# Patient Record
Sex: FEMALE | Race: BLACK OR AFRICAN AMERICAN | NOT HISPANIC OR LATINO | URBAN - METROPOLITAN AREA
[De-identification: names, ages, dates, MRNs, and addresses within clinical notes are randomized per-mention and may not be internally consistent; named-entity substitution may affect disease eponyms.]

---

## 2017-01-21 ENCOUNTER — INPATIENT (INPATIENT)
Facility: HOSPITAL | Age: 47
LOS: 2 days | Discharge: HOME | End: 2017-01-24
Attending: INTERNAL MEDICINE | Admitting: INTERNAL MEDICINE
Payer: COMMERCIAL

## 2017-01-21 VITALS
RESPIRATION RATE: 17 BRPM | HEART RATE: 115 BPM | OXYGEN SATURATION: 97 % | DIASTOLIC BLOOD PRESSURE: 80 MMHG | SYSTOLIC BLOOD PRESSURE: 137 MMHG | TEMPERATURE: 99 F

## 2017-01-21 DIAGNOSIS — Z98.89 OTHER SPECIFIED POSTPROCEDURAL STATES: Chronic | ICD-10-CM

## 2017-01-21 LAB
APPEARANCE UR: SIGNIFICANT CHANGE UP
BASE EXCESS BLDV CALC-SCNC: 3.6 MMOL/L — SIGNIFICANT CHANGE UP
BASOPHILS # BLD AUTO: 0.01 K/UL — SIGNIFICANT CHANGE UP (ref 0–0.2)
BASOPHILS NFR BLD AUTO: 0.1 % — SIGNIFICANT CHANGE UP (ref 0–2)
BILIRUB UR-MCNC: NEGATIVE — SIGNIFICANT CHANGE UP
BLOOD GAS VENOUS - CREATININE: 1.01 MG/DL — SIGNIFICANT CHANGE UP (ref 0.5–1.3)
BLOOD UR QL VISUAL: HIGH
CHLORIDE BLDV-SCNC: 100 MMOL/L — SIGNIFICANT CHANGE UP (ref 96–108)
COLOR SPEC: YELLOW — SIGNIFICANT CHANGE UP
EOSINOPHIL # BLD AUTO: 0.12 K/UL — SIGNIFICANT CHANGE UP (ref 0–0.5)
EOSINOPHIL NFR BLD AUTO: 1.2 % — SIGNIFICANT CHANGE UP (ref 0–6)
GAS PNL BLDV: 131 MMOL/L — LOW (ref 136–146)
GLUCOSE BLDV-MCNC: 124 — HIGH (ref 70–99)
GLUCOSE UR-MCNC: NEGATIVE — SIGNIFICANT CHANGE UP
HCO3 BLDV-SCNC: 26 MMOL/L — SIGNIFICANT CHANGE UP (ref 20–27)
HCT VFR BLD CALC: 39.1 % — SIGNIFICANT CHANGE UP (ref 34.5–45)
HCT VFR BLDV CALC: 41.4 % — SIGNIFICANT CHANGE UP (ref 34.5–45)
HGB BLD-MCNC: 13.3 G/DL — SIGNIFICANT CHANGE UP (ref 11.5–15.5)
HGB BLDV-MCNC: 13.5 G/DL — SIGNIFICANT CHANGE UP (ref 11.5–15.5)
IMM GRANULOCYTES NFR BLD AUTO: 0.2 % — SIGNIFICANT CHANGE UP (ref 0–1.5)
KETONES UR-MCNC: NEGATIVE — SIGNIFICANT CHANGE UP
LACTATE BLDV-MCNC: 1.1 MMOL/L — SIGNIFICANT CHANGE UP (ref 0.5–2)
LEUKOCYTE ESTERASE UR-ACNC: HIGH
LYMPHOCYTES # BLD AUTO: 2.22 K/UL — SIGNIFICANT CHANGE UP (ref 1–3.3)
LYMPHOCYTES # BLD AUTO: 23.1 % — SIGNIFICANT CHANGE UP (ref 13–44)
MCHC RBC-ENTMCNC: 30.8 PG — SIGNIFICANT CHANGE UP (ref 27–34)
MCHC RBC-ENTMCNC: 34 % — SIGNIFICANT CHANGE UP (ref 32–36)
MCV RBC AUTO: 90.5 FL — SIGNIFICANT CHANGE UP (ref 80–100)
MONOCYTES # BLD AUTO: 0.88 K/UL — SIGNIFICANT CHANGE UP (ref 0–0.9)
MONOCYTES NFR BLD AUTO: 9.1 % — SIGNIFICANT CHANGE UP (ref 2–14)
NEUTROPHILS # BLD AUTO: 6.38 K/UL — SIGNIFICANT CHANGE UP (ref 1.8–7.4)
NEUTROPHILS NFR BLD AUTO: 66.3 % — SIGNIFICANT CHANGE UP (ref 43–77)
NITRITE UR-MCNC: NEGATIVE — SIGNIFICANT CHANGE UP
PCO2 BLDV: 46 MMHG — SIGNIFICANT CHANGE UP (ref 41–51)
PH BLDV: 7.41 PH — SIGNIFICANT CHANGE UP (ref 7.32–7.43)
PH UR: 6.5 — SIGNIFICANT CHANGE UP (ref 4.6–8)
PLATELET # BLD AUTO: 213 K/UL — SIGNIFICANT CHANGE UP (ref 150–400)
PMV BLD: 9.7 FL — SIGNIFICANT CHANGE UP (ref 7–13)
PO2 BLDV: 26 MMHG — LOW (ref 35–40)
POTASSIUM BLDV-SCNC: 3.7 MMOL/L — SIGNIFICANT CHANGE UP (ref 3.4–4.5)
PROT UR-MCNC: NEGATIVE — SIGNIFICANT CHANGE UP
RBC # BLD: 4.32 M/UL — SIGNIFICANT CHANGE UP (ref 3.8–5.2)
RBC # FLD: 12.4 % — SIGNIFICANT CHANGE UP (ref 10.3–14.5)
RBC CASTS # UR COMP ASSIST: HIGH (ref 0–?)
SAO2 % BLDV: 41 % — LOW (ref 60–85)
SP GR SPEC: 1.01 — SIGNIFICANT CHANGE UP (ref 1–1.03)
SQUAMOUS # UR AUTO: SIGNIFICANT CHANGE UP
UROBILINOGEN FLD QL: NORMAL E.U. — SIGNIFICANT CHANGE UP (ref 0.1–0.2)
WBC # BLD: 9.63 K/UL — SIGNIFICANT CHANGE UP (ref 3.8–10.5)
WBC # FLD AUTO: 9.63 K/UL — SIGNIFICANT CHANGE UP (ref 3.8–10.5)
WBC UR QL: HIGH (ref 0–?)

## 2017-01-21 RX ORDER — ACETAMINOPHEN 500 MG
650 TABLET ORAL ONCE
Qty: 0 | Refills: 0 | Status: COMPLETED | OUTPATIENT
Start: 2017-01-21 | End: 2017-01-21

## 2017-01-21 RX ORDER — SODIUM CHLORIDE 9 MG/ML
1000 INJECTION INTRAMUSCULAR; INTRAVENOUS; SUBCUTANEOUS ONCE
Qty: 0 | Refills: 0 | Status: COMPLETED | OUTPATIENT
Start: 2017-01-21 | End: 2017-01-21

## 2017-01-21 RX ORDER — MORPHINE SULFATE 50 MG/1
4 CAPSULE, EXTENDED RELEASE ORAL ONCE
Qty: 0 | Refills: 0 | Status: DISCONTINUED | OUTPATIENT
Start: 2017-01-21 | End: 2017-01-21

## 2017-01-21 RX ADMIN — MORPHINE SULFATE 4 MILLIGRAM(S): 50 CAPSULE, EXTENDED RELEASE ORAL at 23:29

## 2017-01-21 RX ADMIN — SODIUM CHLORIDE 1000 MILLILITER(S): 9 INJECTION INTRAMUSCULAR; INTRAVENOUS; SUBCUTANEOUS at 23:29

## 2017-01-21 RX ADMIN — Medication 650 MILLIGRAM(S): at 23:29

## 2017-01-21 NOTE — ED PROVIDER NOTE - MEDICAL DECISION MAKING DETAILS
47 yo F pt presenting with 2 days of fevers, malaise and 1 day of abdominal pain, RLQ tenderness. Consider appy vs. colitis. 45 yo F pt presenting with 2 days of fevers, malaise and 1 day of abdominal pain, RLQ tenderness. Consider appy vs. colitis. Also possibly ovarian pathology, though systemic sx less c/w this dx.  TVUS if sono WNL.

## 2017-01-21 NOTE — ED PROVIDER NOTE - NS ED MD SCRIBE ATTENDING SCRIBE SECTIONS
HISTORY OF PRESENT ILLNESS/PAST MEDICAL/SURGICAL/SOCIAL HISTORY/REVIEW OF SYSTEMS/HIV/PHYSICAL EXAM/DISPOSITION/VITAL SIGNS( Pullset)

## 2017-01-21 NOTE — ED PROVIDER NOTE - CARE PLAN
Instructions for follow-up, activity and diet:	Take medications as prescribed. Follow up with Urology (see list) about this issue (these issues):  kidney stone.  Return if fevers or if pain not controlled with oral medications. Principal Discharge DX:	Diverticulitis of large intestine with abscess without bleeding  Instructions for follow-up, activity and diet:	Take medications as prescribed. Follow up with Urology (see list) about this issue (these issues):  kidney stone.  Return if fevers or if pain not controlled with oral medications.

## 2017-01-21 NOTE — ED ADULT NURSE NOTE - OBJECTIVE STATEMENT
47 yo female received in intake.  Pt c/o right sided abdominal/flank pain with associated nausea and vomiting starting today.  Took Ibuprofen (1900) and Laxative.  LMP: 3 weeks ago.  18R ac.  Pt is resting comfortable in bed.  Vital signs as noted.

## 2017-01-21 NOTE — ED PROVIDER NOTE - PLAN OF CARE
Take medications as prescribed. Follow up with Urology (see list) about this issue (these issues):  kidney stone.  Return if fevers or if pain not controlled with oral medications.

## 2017-01-21 NOTE — ED PROVIDER NOTE - DETAILS:
This scribe's documentation has been prepared under my direction and personally reviewed by me in its entirety. I confirm that the note above accurately reflects all work, treatment, procedures, and medical decision making performed by me.

## 2017-01-21 NOTE — ED PROVIDER NOTE - OBJECTIVE STATEMENT
47 yo F pt w/ no significant PMHx presents to the ED c/o right sided abdominal/flank pain with associated nausea and vomiting starting today. Pt also endorses fevers and chills yesterday. Pt took Ibuprofen (last dose 4 hours ago - 7 PM) and Laxative - had no relief. Rates pain 8/10. Denies hx of similar sx. Denies sob, rash, vaginal discharge/bleeding. LMP: 3 weeks ago. Denies hx of Kidney stones, Ovarian cysts. 45 yo F pt w/ no significant PMHx presents to the ED c/o right sided abdominal/flank pain with associated nausea and vomiting starting today. Pt also endorses fevers and chills since yesterday. Pt took Ibuprofen (last dose 4 hours ago - 7 PM) and Laxative - had no relief. Rates pain 8/10. Denies hx of similar sx. Denies sob, rash, vaginal discharge/bleeding. LMP: 3 weeks ago. Denies hx of Kidney stones, Ovarian cysts.

## 2017-01-21 NOTE — ED PROVIDER NOTE - PROGRESS NOTE DETAILS
pt signed out pending CT scan which showed divertic. Pt had continued pain.  Discussed results and possibliity for inpatient vs outpatient care.  Pt opted to be admitted due to continued pain and concerns over treating at home.  Will admit to hospitalist as pt is affiliated with Dr Cynthia Vidal

## 2017-01-21 NOTE — ED ADULT TRIAGE NOTE - CHIEF COMPLAINT QUOTE
Pt. c/o right sided abdominal/flank pain since today accompanied by n/v this morning and  fever and chills (since last night). Also c/o constipation today. Denies dysuria or hematuria. LMP 3 weeks ago. Took ibuprofen at 8pm tonight.

## 2017-01-22 DIAGNOSIS — Z41.8 ENCOUNTER FOR OTHER PROCEDURES FOR PURPOSES OTHER THAN REMEDYING HEALTH STATE: ICD-10-CM

## 2017-01-22 DIAGNOSIS — K57.92 DIVERTICULITIS OF INTESTINE, PART UNSPECIFIED, WITHOUT PERFORATION OR ABSCESS WITHOUT BLEEDING: ICD-10-CM

## 2017-01-22 DIAGNOSIS — K59.00 CONSTIPATION, UNSPECIFIED: ICD-10-CM

## 2017-01-22 DIAGNOSIS — K57.93 DIVERTICULITIS OF INTESTINE, PART UNSPECIFIED, WITHOUT PERFORATION OR ABSCESS WITH BLEEDING: ICD-10-CM

## 2017-01-22 LAB
ALBUMIN SERPL ELPH-MCNC: 3.6 G/DL — SIGNIFICANT CHANGE UP (ref 3.3–5)
ALBUMIN SERPL ELPH-MCNC: 4.1 G/DL — SIGNIFICANT CHANGE UP (ref 3.3–5)
ALP SERPL-CCNC: 54 U/L — SIGNIFICANT CHANGE UP (ref 40–120)
ALP SERPL-CCNC: 58 U/L — SIGNIFICANT CHANGE UP (ref 40–120)
ALT FLD-CCNC: 10 U/L — SIGNIFICANT CHANGE UP (ref 4–33)
ALT FLD-CCNC: 11 U/L — SIGNIFICANT CHANGE UP (ref 4–33)
APTT BLD: 31.5 SEC — SIGNIFICANT CHANGE UP (ref 27.5–37.4)
AST SERPL-CCNC: 15 U/L — SIGNIFICANT CHANGE UP (ref 4–32)
AST SERPL-CCNC: 15 U/L — SIGNIFICANT CHANGE UP (ref 4–32)
BASOPHILS # BLD AUTO: 0.02 K/UL — SIGNIFICANT CHANGE UP (ref 0–0.2)
BASOPHILS NFR BLD AUTO: 0.2 % — SIGNIFICANT CHANGE UP (ref 0–2)
BILIRUB SERPL-MCNC: 0.4 MG/DL — SIGNIFICANT CHANGE UP (ref 0.2–1.2)
BILIRUB SERPL-MCNC: 0.5 MG/DL — SIGNIFICANT CHANGE UP (ref 0.2–1.2)
BLD GP AB SCN SERPL QL: NEGATIVE — SIGNIFICANT CHANGE UP
BUN SERPL-MCNC: 14 MG/DL — SIGNIFICANT CHANGE UP (ref 7–23)
BUN SERPL-MCNC: 17 MG/DL — SIGNIFICANT CHANGE UP (ref 7–23)
CALCIUM SERPL-MCNC: 8.7 MG/DL — SIGNIFICANT CHANGE UP (ref 8.4–10.5)
CALCIUM SERPL-MCNC: 9.5 MG/DL — SIGNIFICANT CHANGE UP (ref 8.4–10.5)
CHLORIDE SERPL-SCNC: 100 MMOL/L — SIGNIFICANT CHANGE UP (ref 98–107)
CHLORIDE SERPL-SCNC: 95 MMOL/L — LOW (ref 98–107)
CO2 SERPL-SCNC: 21 MMOL/L — LOW (ref 22–31)
CO2 SERPL-SCNC: 25 MMOL/L — SIGNIFICANT CHANGE UP (ref 22–31)
CREAT SERPL-MCNC: 0.84 MG/DL — SIGNIFICANT CHANGE UP (ref 0.5–1.3)
CREAT SERPL-MCNC: 0.95 MG/DL — SIGNIFICANT CHANGE UP (ref 0.5–1.3)
EOSINOPHIL # BLD AUTO: 0.13 K/UL — SIGNIFICANT CHANGE UP (ref 0–0.5)
EOSINOPHIL NFR BLD AUTO: 1.5 % — SIGNIFICANT CHANGE UP (ref 0–6)
GLUCOSE SERPL-MCNC: 111 MG/DL — HIGH (ref 70–99)
GLUCOSE SERPL-MCNC: 120 MG/DL — HIGH (ref 70–99)
HCT VFR BLD CALC: 37.6 % — SIGNIFICANT CHANGE UP (ref 34.5–45)
HGB BLD-MCNC: 12.7 G/DL — SIGNIFICANT CHANGE UP (ref 11.5–15.5)
IMM GRANULOCYTES NFR BLD AUTO: 0.1 % — SIGNIFICANT CHANGE UP (ref 0–1.5)
INR BLD: 1.1 — SIGNIFICANT CHANGE UP (ref 0.87–1.18)
LYMPHOCYTES # BLD AUTO: 2.33 K/UL — SIGNIFICANT CHANGE UP (ref 1–3.3)
LYMPHOCYTES # BLD AUTO: 26.7 % — SIGNIFICANT CHANGE UP (ref 13–44)
MAGNESIUM SERPL-MCNC: 1.9 MG/DL — SIGNIFICANT CHANGE UP (ref 1.6–2.6)
MCHC RBC-ENTMCNC: 30.8 PG — SIGNIFICANT CHANGE UP (ref 27–34)
MCHC RBC-ENTMCNC: 33.8 % — SIGNIFICANT CHANGE UP (ref 32–36)
MCV RBC AUTO: 91 FL — SIGNIFICANT CHANGE UP (ref 80–100)
MONOCYTES # BLD AUTO: 1.1 K/UL — HIGH (ref 0–0.9)
MONOCYTES NFR BLD AUTO: 12.6 % — SIGNIFICANT CHANGE UP (ref 2–14)
NEUTROPHILS # BLD AUTO: 5.13 K/UL — SIGNIFICANT CHANGE UP (ref 1.8–7.4)
NEUTROPHILS NFR BLD AUTO: 58.9 % — SIGNIFICANT CHANGE UP (ref 43–77)
PHOSPHATE SERPL-MCNC: 3.4 MG/DL — SIGNIFICANT CHANGE UP (ref 2.5–4.5)
PLATELET # BLD AUTO: 200 K/UL — SIGNIFICANT CHANGE UP (ref 150–400)
PMV BLD: 9.8 FL — SIGNIFICANT CHANGE UP (ref 7–13)
POTASSIUM SERPL-MCNC: 3.8 MMOL/L — SIGNIFICANT CHANGE UP (ref 3.5–5.3)
POTASSIUM SERPL-MCNC: 4 MMOL/L — SIGNIFICANT CHANGE UP (ref 3.5–5.3)
POTASSIUM SERPL-SCNC: 3.8 MMOL/L — SIGNIFICANT CHANGE UP (ref 3.5–5.3)
POTASSIUM SERPL-SCNC: 4 MMOL/L — SIGNIFICANT CHANGE UP (ref 3.5–5.3)
PROT SERPL-MCNC: 6.6 G/DL — SIGNIFICANT CHANGE UP (ref 6–8.3)
PROT SERPL-MCNC: 7.5 G/DL — SIGNIFICANT CHANGE UP (ref 6–8.3)
PROTHROM AB SERPL-ACNC: 12.5 SEC — SIGNIFICANT CHANGE UP (ref 10–13.1)
RBC # BLD: 4.13 M/UL — SIGNIFICANT CHANGE UP (ref 3.8–5.2)
RBC # FLD: 12.5 % — SIGNIFICANT CHANGE UP (ref 10.3–14.5)
RH IG SCN BLD-IMP: POSITIVE — SIGNIFICANT CHANGE UP
SODIUM SERPL-SCNC: 135 MMOL/L — SIGNIFICANT CHANGE UP (ref 135–145)
SODIUM SERPL-SCNC: 137 MMOL/L — SIGNIFICANT CHANGE UP (ref 135–145)
WBC # BLD: 8.72 K/UL — SIGNIFICANT CHANGE UP (ref 3.8–10.5)
WBC # FLD AUTO: 8.72 K/UL — SIGNIFICANT CHANGE UP (ref 3.8–10.5)

## 2017-01-22 PROCEDURE — 74177 CT ABD & PELVIS W/CONTRAST: CPT | Mod: 26

## 2017-01-22 PROCEDURE — 99223 1ST HOSP IP/OBS HIGH 75: CPT | Mod: GC

## 2017-01-22 RX ORDER — METRONIDAZOLE 500 MG
500 TABLET ORAL EVERY 8 HOURS
Qty: 0 | Refills: 0 | Status: DISCONTINUED | OUTPATIENT
Start: 2017-01-22 | End: 2017-01-24

## 2017-01-22 RX ORDER — SODIUM CHLORIDE 9 MG/ML
1000 INJECTION, SOLUTION INTRAVENOUS
Qty: 0 | Refills: 0 | Status: DISCONTINUED | OUTPATIENT
Start: 2017-01-22 | End: 2017-01-22

## 2017-01-22 RX ORDER — POLYETHYLENE GLYCOL 3350 17 G/17G
17 POWDER, FOR SOLUTION ORAL DAILY
Qty: 0 | Refills: 0 | Status: DISCONTINUED | OUTPATIENT
Start: 2017-01-22 | End: 2017-01-22

## 2017-01-22 RX ORDER — IBUPROFEN 200 MG
1 TABLET ORAL
Qty: 50 | Refills: 0 | OUTPATIENT
Start: 2017-01-22

## 2017-01-22 RX ORDER — ONDANSETRON 8 MG/1
4 TABLET, FILM COATED ORAL EVERY 8 HOURS
Qty: 0 | Refills: 0 | Status: DISCONTINUED | OUTPATIENT
Start: 2017-01-22 | End: 2017-01-24

## 2017-01-22 RX ORDER — LANOLIN ALCOHOL/MO/W.PET/CERES
3 CREAM (GRAM) TOPICAL AT BEDTIME
Qty: 0 | Refills: 0 | Status: DISCONTINUED | OUTPATIENT
Start: 2017-01-22 | End: 2017-01-24

## 2017-01-22 RX ORDER — PIPERACILLIN AND TAZOBACTAM 4; .5 G/20ML; G/20ML
3.38 INJECTION, POWDER, LYOPHILIZED, FOR SOLUTION INTRAVENOUS ONCE
Qty: 0 | Refills: 0 | Status: COMPLETED | OUTPATIENT
Start: 2017-01-22 | End: 2017-01-22

## 2017-01-22 RX ORDER — ACETAMINOPHEN 500 MG
1 TABLET ORAL
Qty: 50 | Refills: 0 | OUTPATIENT
Start: 2017-01-22

## 2017-01-22 RX ORDER — CIPROFLOXACIN LACTATE 400MG/40ML
400 VIAL (ML) INTRAVENOUS EVERY 12 HOURS
Qty: 0 | Refills: 0 | Status: DISCONTINUED | OUTPATIENT
Start: 2017-01-22 | End: 2017-01-24

## 2017-01-22 RX ORDER — LACTOBACILLUS ACIDOPHILUS 100MM CELL
1 CAPSULE ORAL
Qty: 0 | Refills: 0 | Status: DISCONTINUED | OUTPATIENT
Start: 2017-01-22 | End: 2017-01-23

## 2017-01-22 RX ORDER — OXYCODONE HYDROCHLORIDE 5 MG/1
5 TABLET ORAL EVERY 6 HOURS
Qty: 0 | Refills: 0 | Status: DISCONTINUED | OUTPATIENT
Start: 2017-01-22 | End: 2017-01-23

## 2017-01-22 RX ORDER — SENNA PLUS 8.6 MG/1
2 TABLET ORAL AT BEDTIME
Qty: 0 | Refills: 0 | Status: DISCONTINUED | OUTPATIENT
Start: 2017-01-22 | End: 2017-01-22

## 2017-01-22 RX ORDER — SODIUM CHLORIDE 9 MG/ML
1000 INJECTION, SOLUTION INTRAVENOUS
Qty: 0 | Refills: 0 | Status: DISCONTINUED | OUTPATIENT
Start: 2017-01-22 | End: 2017-01-23

## 2017-01-22 RX ORDER — TAMSULOSIN HYDROCHLORIDE 0.4 MG/1
1 CAPSULE ORAL
Qty: 28 | Refills: 0 | OUTPATIENT
Start: 2017-01-22 | End: 2017-02-19

## 2017-01-22 RX ORDER — DOCUSATE SODIUM 100 MG
100 CAPSULE ORAL THREE TIMES A DAY
Qty: 0 | Refills: 0 | Status: DISCONTINUED | OUTPATIENT
Start: 2017-01-22 | End: 2017-01-22

## 2017-01-22 RX ORDER — ENOXAPARIN SODIUM 100 MG/ML
40 INJECTION SUBCUTANEOUS DAILY
Qty: 0 | Refills: 0 | Status: DISCONTINUED | OUTPATIENT
Start: 2017-01-22 | End: 2017-01-24

## 2017-01-22 RX ORDER — OXYCODONE HYDROCHLORIDE 5 MG/1
5 TABLET ORAL ONCE
Qty: 0 | Refills: 0 | Status: DISCONTINUED | OUTPATIENT
Start: 2017-01-22 | End: 2017-01-22

## 2017-01-22 RX ORDER — SODIUM CHLORIDE 9 MG/ML
500 INJECTION, SOLUTION INTRAVENOUS ONCE
Qty: 0 | Refills: 0 | Status: COMPLETED | OUTPATIENT
Start: 2017-01-22 | End: 2017-01-22

## 2017-01-22 RX ADMIN — Medication 3 MILLIGRAM(S): at 22:21

## 2017-01-22 RX ADMIN — PIPERACILLIN AND TAZOBACTAM 200 GRAM(S): 4; .5 INJECTION, POWDER, LYOPHILIZED, FOR SOLUTION INTRAVENOUS at 01:22

## 2017-01-22 RX ADMIN — MORPHINE SULFATE 4 MILLIGRAM(S): 50 CAPSULE, EXTENDED RELEASE ORAL at 00:03

## 2017-01-22 RX ADMIN — Medication 100 MILLIGRAM(S): at 22:21

## 2017-01-22 RX ADMIN — OXYCODONE HYDROCHLORIDE 5 MILLIGRAM(S): 5 TABLET ORAL at 11:54

## 2017-01-22 RX ADMIN — OXYCODONE HYDROCHLORIDE 5 MILLIGRAM(S): 5 TABLET ORAL at 10:54

## 2017-01-22 RX ADMIN — Medication 200 MILLIGRAM(S): at 05:56

## 2017-01-22 RX ADMIN — OXYCODONE HYDROCHLORIDE 5 MILLIGRAM(S): 5 TABLET ORAL at 04:12

## 2017-01-22 RX ADMIN — Medication 100 MILLIGRAM(S): at 06:33

## 2017-01-22 RX ADMIN — Medication 200 MILLIGRAM(S): at 18:53

## 2017-01-22 RX ADMIN — Medication 100 MILLIGRAM(S): at 13:09

## 2017-01-22 RX ADMIN — OXYCODONE HYDROCHLORIDE 5 MILLIGRAM(S): 5 TABLET ORAL at 05:14

## 2017-01-22 RX ADMIN — OXYCODONE HYDROCHLORIDE 5 MILLIGRAM(S): 5 TABLET ORAL at 19:23

## 2017-01-22 RX ADMIN — ONDANSETRON 4 MILLIGRAM(S): 8 TABLET, FILM COATED ORAL at 07:53

## 2017-01-22 RX ADMIN — SODIUM CHLORIDE 500 MILLILITER(S): 9 INJECTION, SOLUTION INTRAVENOUS at 10:50

## 2017-01-22 RX ADMIN — SODIUM CHLORIDE 100 MILLILITER(S): 9 INJECTION, SOLUTION INTRAVENOUS at 06:33

## 2017-01-22 RX ADMIN — SODIUM CHLORIDE 75 MILLILITER(S): 9 INJECTION, SOLUTION INTRAVENOUS at 13:23

## 2017-01-22 NOTE — H&P ADULT. - HISTORY OF PRESENT ILLNESS
46 yr female w/ no significant past medical history presenting with right sided abdominal pain      In ED, vitals: T:98.9, HR:115, BP:137/80, RR: 17 and O2 sat: 97% on RA. A CT A/P showed acute diverticulitis involving the ascending colon with   possible tiny contained perforation. Pt received 1L NS, Zosyn, Tylenol and OxycodoneIR 5mg and morphine 4mg IV. 46 yr female w/ hx of bladder prolapse s/p  presenting with right sided abdominal pain x 1 day.     Patient reports that yesterday afternoon she developed RLQ abdominal pain as well as fevers/chills (although she did not take her temperature). She explains that her pain is sharp, localized to her RLQ, 8/10 in severity and associated with nausea, 1 episode of non-bloody, non-bilious vomiting as well as reduced appetite. She states that she did not take anything to alleviate the pain and came to the ED because she was concerned that she may have appendicitis. She denies any headaches, vision changes, diarrhea, hematochezia, and melena. She endorses constipation, stating that her last bowel movement was several days ago.     In ED, vitals: T:98.9, HR:115, BP:137/80, RR: 17 and O2 sat: 97% on RA. A CT A/P showed acute diverticulitis involving the ascending colon with   possible tiny contained perforation. Pt received 1L NS, Zosyn, Tylenol and OxycodoneIR 5mg and morphine 4mg IV. 46 yr female w/ hx of bladder prolapse s/p pessary placement presenting with right sided abdominal pain x 1 day.     Patient reports that yesterday afternoon she developed RLQ abdominal pain as well as fevers/chills (although she did not take her temperature). She explains that her pain is sharp, localized to her RLQ, 8/10 in severity and associated with nausea, 1 episode of non-bloody, non-bilious vomiting as well as reduced appetite. She states that she did not take anything to alleviate the pain and came to the ED because she was concerned that she may have appendicitis. She denies any headaches, vision changes, diarrhea, hematochezia, and melena. She endorses constipation, stating that her last bowel movement was several days ago.     In ED, vitals: T:98.9, HR:115, BP:137/80, RR: 17 and O2 sat: 97% on RA. A CT A/P showed acute diverticulitis involving the ascending colon with   possible tiny contained perforation. Pt received 1L NS, Zosyn, Tylenol and OxycodoneIR 5mg and morphine 4mg IV. 46 yr female w/ hx of bladder prolapse s/p pessary placement and significant constipation presenting with right sided abdominal pain x 1 day.     Patient reports that yesterday afternoon she developed RLQ abdominal pain as well as fevers/chills (although she did not take her temperature). She explains that her pain is sharp, localized to her RLQ, 8/10 in severity and associated with nausea, 1 episode of non-bloody, non-bilious vomiting as well as reduced appetite. She states that she did not take anything to alleviate the pain and came to the ED because she was concerned that she may have appendicitis. She denies any headaches, vision changes, diarrhea, hematochezia, and melena. She endorses constipation, stating that her last bowel movement was several days ago.     In ED, vitals: T:98.9, HR:115, BP:137/80, RR: 17 and O2 sat: 97% on RA. A CT A/P showed acute diverticulitis involving the ascending colon with   possible tiny contained perforation. Pt received 1L NS, Zosyn, Tylenol and OxycodoneIR 5mg and morphine 4mg IV.

## 2017-01-22 NOTE — H&P ADULT. - RADIOLOGY RESULTS AND INTERPRETATION
CT A/P reviewed:  acute diverticulitis involving the ascending colon with possible tiny contained perforation

## 2017-01-22 NOTE — H&P ADULT. - PROBLEM SELECTOR PLAN 1
pt pt found to have diverticulitis of ascending colon with possible microperforation   - start ciprofloxacin and flagyl  - LR @ 100cc/hr   - monitor CBC   - advance diet as pt tolerates pt found to have diverticulitis of ascending colon with possible microperforation   - start IV ciprofloxacin and flagyl  - LR @ 100cc/hr   - monitor CBC   - NPO for now, advance diet as pt tolerates  - GI consult in AM  - pain control w/ oxycodoneIR 5mg q6hr PRN pt found to have diverticulitis of ascending colon with possible microperforation   - most likely sequela of significant constipation  - start IV ciprofloxacin and flagyl  - LR @ 100cc/hr   - monitor CBC   - NPO for now, advance diet as pt tolerates  - GI consult in AM  - pain control w/ oxycodoneIR 5mg q6hr PRN

## 2017-01-22 NOTE — H&P ADULT. - FAMILY HISTORY
Family history of diabetes mellitus     Family history of kidney disease     Sibling  Still living? Unknown  Family history of bipolar disorder, Age at diagnosis: Age Unknown     Grandparent  Still living? Unknown  Family history of prostate cancer, Age at diagnosis: Age Unknown Sibling  Still living? Unknown  Family history of bipolar disorder, Age at diagnosis: Age Unknown     Grandparent  Still living? Unknown  Family history of prostate cancer, Age at diagnosis: Age Unknown     Mother  Still living? Unknown  Family history of diabetes mellitus, Age at diagnosis: Age Unknown  Family history of kidney disease, Age at diagnosis: Age Unknown

## 2017-01-22 NOTE — H&P ADULT. - PROBLEM SELECTOR PLAN 2
lovenox - chronic issue  - patient reports using "3 K-swiss laxatives" to help alleviate constipation, but to no avail  - last bowel movements days ago  - consider starting GI prophylaxis - colace and senna started (also especially since patient on opioid)  -- ensure adequate fluid intake (encourage patient to keep self hydrated)

## 2017-01-22 NOTE — ED ADULT NURSE REASSESSMENT NOTE - NS ED NURSE REASSESS COMMENT FT1
Pt. alert, awake, reports return of right sided abdominal pain now 5/10. VS as noted, admitted to medicine, MAR notified and aware of patient of pain. Awaiting admitting orders.

## 2017-01-22 NOTE — H&P ADULT. - ASSESSMENT
45 yr female w/ no significant past medical history presenting with R sided abdominal pain found to have acute diverticulitis of ascending colon w/ possible contained microperforation 46 yr female w/ hx of bladder prolapse s/p pessary placement presenting with RLQ pain x 1 day admitted with acute diverticulitis of ascending colon w/ possible contained microperforation

## 2017-01-23 PROCEDURE — 99222 1ST HOSP IP/OBS MODERATE 55: CPT | Mod: GC

## 2017-01-23 PROCEDURE — 99233 SBSQ HOSP IP/OBS HIGH 50: CPT | Mod: GC

## 2017-01-23 RX ORDER — CAFFEINE 200 MG
100 TABLET ORAL ONCE
Qty: 0 | Refills: 0 | Status: COMPLETED | OUTPATIENT
Start: 2017-01-23 | End: 2017-01-23

## 2017-01-23 RX ORDER — ACETAMINOPHEN 500 MG
650 TABLET ORAL ONCE
Qty: 0 | Refills: 0 | Status: COMPLETED | OUTPATIENT
Start: 2017-01-23 | End: 2017-01-23

## 2017-01-23 RX ORDER — ACETAMINOPHEN/CAFFEINE 500MG-65MG
1 TABLET ORAL DAILY
Qty: 0 | Refills: 0 | Status: DISCONTINUED | OUTPATIENT
Start: 2017-01-23 | End: 2017-01-23

## 2017-01-23 RX ORDER — SODIUM CHLORIDE 9 MG/ML
1000 INJECTION, SOLUTION INTRAVENOUS
Qty: 0 | Refills: 0 | Status: DISCONTINUED | OUTPATIENT
Start: 2017-01-23 | End: 2017-01-24

## 2017-01-23 RX ORDER — LACTOBACILLUS ACIDOPHILUS 100MM CELL
1 CAPSULE ORAL EVERY 12 HOURS
Qty: 0 | Refills: 0 | Status: DISCONTINUED | OUTPATIENT
Start: 2017-01-23 | End: 2017-01-24

## 2017-01-23 RX ORDER — OXYCODONE HYDROCHLORIDE 5 MG/1
5 TABLET ORAL EVERY 4 HOURS
Qty: 0 | Refills: 0 | Status: DISCONTINUED | OUTPATIENT
Start: 2017-01-23 | End: 2017-01-24

## 2017-01-23 RX ADMIN — OXYCODONE HYDROCHLORIDE 5 MILLIGRAM(S): 5 TABLET ORAL at 07:39

## 2017-01-23 RX ADMIN — Medication 100 MILLIGRAM(S): at 13:26

## 2017-01-23 RX ADMIN — Medication 650 MILLIGRAM(S): at 14:13

## 2017-01-23 RX ADMIN — Medication 200 MILLIGRAM(S): at 17:57

## 2017-01-23 RX ADMIN — Medication 650 MILLIGRAM(S): at 13:25

## 2017-01-23 RX ADMIN — ONDANSETRON 4 MILLIGRAM(S): 8 TABLET, FILM COATED ORAL at 19:41

## 2017-01-23 RX ADMIN — ENOXAPARIN SODIUM 40 MILLIGRAM(S): 100 INJECTION SUBCUTANEOUS at 13:26

## 2017-01-23 RX ADMIN — OXYCODONE HYDROCHLORIDE 5 MILLIGRAM(S): 5 TABLET ORAL at 17:55

## 2017-01-23 RX ADMIN — Medication 100 MILLIGRAM(S): at 14:13

## 2017-01-23 RX ADMIN — SODIUM CHLORIDE 75 MILLILITER(S): 9 INJECTION, SOLUTION INTRAVENOUS at 09:03

## 2017-01-23 RX ADMIN — OXYCODONE HYDROCHLORIDE 5 MILLIGRAM(S): 5 TABLET ORAL at 07:08

## 2017-01-23 RX ADMIN — Medication 1 TABLET(S): at 17:56

## 2017-01-23 RX ADMIN — Medication 200 MILLIGRAM(S): at 05:27

## 2017-01-23 RX ADMIN — OXYCODONE HYDROCHLORIDE 5 MILLIGRAM(S): 5 TABLET ORAL at 18:32

## 2017-01-23 RX ADMIN — Medication 3 MILLIGRAM(S): at 22:10

## 2017-01-23 RX ADMIN — Medication 100 MILLIGRAM(S): at 07:13

## 2017-01-23 RX ADMIN — Medication 100 MILLIGRAM(S): at 22:10

## 2017-01-24 ENCOUNTER — CLINICAL ADVICE (OUTPATIENT)
Age: 47
End: 2017-01-24

## 2017-01-24 VITALS
OXYGEN SATURATION: 98 % | HEART RATE: 95 BPM | RESPIRATION RATE: 18 BRPM | TEMPERATURE: 98 F | SYSTOLIC BLOOD PRESSURE: 120 MMHG | DIASTOLIC BLOOD PRESSURE: 69 MMHG

## 2017-01-24 LAB
BUN SERPL-MCNC: 5 MG/DL — LOW (ref 7–23)
CALCIUM SERPL-MCNC: 8.2 MG/DL — LOW (ref 8.4–10.5)
CHLORIDE SERPL-SCNC: 87 MMOL/L — LOW (ref 98–107)
CO2 SERPL-SCNC: 22 MMOL/L — SIGNIFICANT CHANGE UP (ref 22–31)
CREAT SERPL-MCNC: 0.76 MG/DL — SIGNIFICANT CHANGE UP (ref 0.5–1.3)
GLUCOSE SERPL-MCNC: 580 MG/DL — CRITICAL HIGH (ref 70–99)
POTASSIUM SERPL-MCNC: 3.6 MMOL/L — SIGNIFICANT CHANGE UP (ref 3.5–5.3)
POTASSIUM SERPL-SCNC: 3.6 MMOL/L — SIGNIFICANT CHANGE UP (ref 3.5–5.3)
SODIUM SERPL-SCNC: 124 MMOL/L — LOW (ref 135–145)

## 2017-01-24 PROCEDURE — 99239 HOSP IP/OBS DSCHRG MGMT >30: CPT

## 2017-01-24 PROCEDURE — 99221 1ST HOSP IP/OBS SF/LOW 40: CPT

## 2017-01-24 RX ORDER — OXYCODONE HYDROCHLORIDE 5 MG/1
1 TABLET ORAL
Qty: 5 | Refills: 0 | OUTPATIENT
Start: 2017-01-24 | End: 2017-01-29

## 2017-01-24 RX ORDER — SODIUM CHLORIDE 9 MG/ML
1000 INJECTION, SOLUTION INTRAVENOUS
Qty: 0 | Refills: 0 | Status: DISCONTINUED | OUTPATIENT
Start: 2017-01-24 | End: 2017-01-24

## 2017-01-24 RX ORDER — LANOLIN ALCOHOL/MO/W.PET/CERES
1 CREAM (GRAM) TOPICAL
Qty: 15 | Refills: 0 | OUTPATIENT
Start: 2017-01-24 | End: 2017-02-08

## 2017-01-24 RX ORDER — INFLUENZA VIRUS VACCINE 15; 15; 15; 15 UG/.5ML; UG/.5ML; UG/.5ML; UG/.5ML
0.5 SUSPENSION INTRAMUSCULAR ONCE
Qty: 0 | Refills: 0 | Status: COMPLETED | OUTPATIENT
Start: 2017-01-24 | End: 2017-01-24

## 2017-01-24 RX ORDER — METRONIDAZOLE 500 MG
1 TABLET ORAL
Qty: 28 | Refills: 0 | OUTPATIENT
Start: 2017-01-24 | End: 2017-01-31

## 2017-01-24 RX ORDER — CAFFEINE 200 MG
100 TABLET ORAL ONCE
Qty: 0 | Refills: 0 | Status: COMPLETED | OUTPATIENT
Start: 2017-01-24 | End: 2017-01-24

## 2017-01-24 RX ORDER — CIPROFLOXACIN LACTATE 400MG/40ML
1 VIAL (ML) INTRAVENOUS
Qty: 14 | Refills: 0 | OUTPATIENT
Start: 2017-01-24 | End: 2017-01-31

## 2017-01-24 RX ORDER — SENNA PLUS 8.6 MG/1
2 TABLET ORAL
Qty: 0 | Refills: 0 | COMMUNITY

## 2017-01-24 RX ORDER — DOCUSATE SODIUM 100 MG
1 CAPSULE ORAL
Qty: 0 | Refills: 0 | COMMUNITY

## 2017-01-24 RX ADMIN — Medication 100 MILLIGRAM(S): at 07:51

## 2017-01-24 RX ADMIN — Medication 1 TABLET(S): at 17:53

## 2017-01-24 RX ADMIN — Medication 200 MILLIGRAM(S): at 05:29

## 2017-01-24 RX ADMIN — INFLUENZA VIRUS VACCINE 0.5 MILLILITER(S): 15; 15; 15; 15 SUSPENSION INTRAMUSCULAR at 18:17

## 2017-01-24 RX ADMIN — Medication 100 MILLIGRAM(S): at 15:55

## 2017-01-24 RX ADMIN — Medication 200 MILLIGRAM(S): at 17:53

## 2017-01-24 RX ADMIN — Medication 1 TABLET(S): at 05:29

## 2017-01-24 RX ADMIN — ENOXAPARIN SODIUM 40 MILLIGRAM(S): 100 INJECTION SUBCUTANEOUS at 11:46

## 2017-01-24 RX ADMIN — SODIUM CHLORIDE 75 MILLILITER(S): 9 INJECTION, SOLUTION INTRAVENOUS at 10:30

## 2017-01-24 RX ADMIN — Medication 100 MILLIGRAM(S): at 06:22

## 2017-01-24 NOTE — DISCHARGE NOTE ADULT - CARE PROVIDERS DIRECT ADDRESSES
,DirectAddress_Unknown,DirectAddress_Unknown,DirectAddress_Unknown,[email or web address]

## 2017-01-24 NOTE — DISCHARGE NOTE ADULT - ADDITIONAL INSTRUCTIONS
Please follow up with your PMD in 1 week of discharge  Please follow up with your colorectal surgeon within 1 month of discharge for follow up and possible colonoscopy. Please follow up with your PMD in 1 week of discharge  Please follow up with your colorectal surgeon within 1 month of discharge for follow up and possible colonoscopy

## 2017-01-24 NOTE — DISCHARGE NOTE ADULT - MEDICATION SUMMARY - MEDICATIONS TO TAKE
I will START or STAY ON the medications listed below when I get home from the hospital:    Flagyl 500 mg oral tablet  -- 1 tab(s) by mouth 4 times a day   -- Do not drink alcoholic beverages when taking this medication.  Finish all this medication unless otherwise directed by prescriber.  May discolor urine or feces.    -- Indication: For Acute diverticulitis of intestine    oxyCODONE 5 mg oral tablet  -- 1 tab(s) by mouth 1 to 3 times a day, As Needed, moderate or severe pain MDD:3 tabs   -- Indication: For Acute diverticulitis of intestine    senna oral tablet  -- 2 tab(s) by mouth once a day  after your stomach pain is resolved.   -- Indication: For Constipation, unspecified constipation type    Colace 100 mg oral capsule  -- 1 cap(s) by mouth 2 times a day  after your stomach pain is resolved.   -- Indication: For Constipation, unspecified constipation type    melatonin 3 mg oral tablet  -- 1 tab(s) by mouth once a day (at bedtime), As needed, Insomnia   -- Indication: For Insomnia    ciprofloxacin 750 mg oral tablet  -- 1 tab(s) by mouth every 12 hours  -- Avoid prolonged or excessive exposure to direct and/or artificial sunlight while taking this medication.  Check with your doctor before becoming pregnant.  Do not take dairy products, antacids, or iron preparations within one hour of this medication.  Finish all this medication unless otherwise directed by prescriber.  Medication should be taken with plenty of water.    -- Indication: For Acute diverticulitis of intestine

## 2017-01-24 NOTE — DISCHARGE NOTE ADULT - PROVIDER TOKENS
TOKEN:'[id number]',FREE:[LAST:[[facility]],PHONE:[(   )    -],FAX:[(   )    -],ADDRESS:[phone]]],FREE:[LAST:[.],PHONE:[(   )    -],FAX:[(   )    -],ADDRESS:[Dr. Cynthia Vidal MD  Internist · [place]  Address: [address], [place] [address]  Phone: [phone]]]

## 2017-01-24 NOTE — DISCHARGE NOTE ADULT - PLAN OF CARE
Prevention of recurrent, resolution of symptoms and follow up with GI and Surgery Please continue to take your medications as directed. Please follow up with your PMD/GI/Surgeon. Please continue to take your medications as directed. Please follow up with your PMD/GI/Surgeon.  Please continue with a high fiber diet once your abn is completely resolved. In the mean time continue to take a low residual diet. After your abdominal pain resolved please continue with your laxatives to prevent constipation.

## 2017-01-24 NOTE — DISCHARGE NOTE ADULT - HOSPITAL COURSE
46 yr female w/ hx of bladder prolapse s/p pessary placement and significant constipation presenting with right sided abdominal pain x 1 day.   The patient was found to have acute diverticulitis with possible microperf.    The patient was admitted to medicine was made NPO, started on IVF and atb cipro/flagyl. The patient pain was controlled with PO percocet. The patient pain improved and diet was advanced. Surgery and GI were both consulted for micro perf No interventions in the hospital. The patient pain improved significantly and tolerated a low residual diet. The patient was discharged in a stable condition with instruction to follow up with GI/Surgery/PMD. Will need a colonoscopy after acute abn pain is resolved. Start high fiber diet once acute pain is improved.

## 2017-01-24 NOTE — DISCHARGE NOTE ADULT - CARE PLAN
Principal Discharge DX:	Acute diverticulitis of intestine  Goal:	Prevention of recurrent, resolution of symptoms and follow up with GI and Surgery  Instructions for follow-up, activity and diet:	Please continue to take your medications as directed. Please follow up with your PMD/GI/Surgeon.  Secondary Diagnosis:	Constipation, unspecified constipation type Principal Discharge DX:	Acute diverticulitis of intestine  Goal:	Prevention of recurrent, resolution of symptoms and follow up with GI and Surgery  Instructions for follow-up, activity and diet:	Please continue to take your medications as directed. Please follow up with your PMD/GI/Surgeon.  Please continue with a high fiber diet once your abn is completely resolved. In the mean time continue to take a low residual diet.  Secondary Diagnosis:	Constipation, unspecified constipation type Principal Discharge DX:	Acute diverticulitis of intestine  Goal:	Prevention of recurrent, resolution of symptoms and follow up with GI and Surgery  Instructions for follow-up, activity and diet:	Please continue to take your medications as directed. Please follow up with your PMD/GI/Surgeon.  Please continue with a high fiber diet once your abn is completely resolved. In the mean time continue to take a low residual diet.  Secondary Diagnosis:	Constipation, unspecified constipation type  Instructions for follow-up, activity and diet:	After your abdominal pain resolved please continue with your laxatives to prevent constipation.

## 2017-01-24 NOTE — DISCHARGE NOTE ADULT - NS TRANSFER PATIENT BELONGINGS
IPad/Clothing/Electronic Device (specify) Electronic Device (specify)/Clothing/Cell Phone/PDA (specify)/IPad

## 2017-01-24 NOTE — DISCHARGE NOTE ADULT - PATIENT PORTAL LINK FT
“You can access the FollowHealth Patient Portal, offered by [facility], by registering with the following website: [email or web address]”

## 2017-01-24 NOTE — DISCHARGE NOTE ADULT - CARE PROVIDER_API CALL
Jarvis Conner (MD), Surgery; Surgical Critical Care  [address] [place] [address]  Phone: [phone]  Fax: [phone]    [facility],   [phone]  Phone: (   )    -  Fax: (   )    -    .,   Dr. Cynthia Vidal MD  Internist · [place]  Address: [address], [place] [address]  Phone: [phone]  Phone: (   )    -  Fax: (   )    -

## 2017-01-24 NOTE — DISCHARGE NOTE ADULT - CONDITIONS AT DISCHARGE
A&Ox4. Pt is calm, cooperative and compliant with tx. Pt denies SOB/Chest pain. VSS. Afebrile. Denies pain/discomfort in abdomen. Pt tolerated advanced diet.

## 2018-01-29 ENCOUNTER — EMERGENCY (EMERGENCY)
Facility: HOSPITAL | Age: 48
LOS: 1 days | Discharge: HOME | End: 2018-01-29
Attending: EMERGENCY MEDICINE | Admitting: EMERGENCY MEDICINE
Payer: COMMERCIAL

## 2018-01-29 VITALS
TEMPERATURE: 99 F | SYSTOLIC BLOOD PRESSURE: 121 MMHG | OXYGEN SATURATION: 99 % | RESPIRATION RATE: 18 BRPM | DIASTOLIC BLOOD PRESSURE: 79 MMHG | HEART RATE: 89 BPM

## 2018-01-29 DIAGNOSIS — Z98.89 OTHER SPECIFIED POSTPROCEDURAL STATES: Chronic | ICD-10-CM

## 2018-01-29 PROBLEM — K59.00 CONSTIPATION, UNSPECIFIED: Chronic | Status: ACTIVE | Noted: 2017-01-22

## 2018-01-29 LAB
ALBUMIN SERPL ELPH-MCNC: 3.9 G/DL — SIGNIFICANT CHANGE UP (ref 3.3–5)
ALP SERPL-CCNC: 53 U/L — SIGNIFICANT CHANGE UP (ref 40–120)
ALT FLD-CCNC: 13 U/L — SIGNIFICANT CHANGE UP (ref 4–33)
APPEARANCE UR: CLEAR — SIGNIFICANT CHANGE UP
AST SERPL-CCNC: 18 U/L — SIGNIFICANT CHANGE UP (ref 4–32)
BACTERIA # UR AUTO: SIGNIFICANT CHANGE UP
BASOPHILS # BLD AUTO: 0.04 K/UL — SIGNIFICANT CHANGE UP (ref 0–0.2)
BASOPHILS NFR BLD AUTO: 0.5 % — SIGNIFICANT CHANGE UP (ref 0–2)
BILIRUB SERPL-MCNC: 0.3 MG/DL — SIGNIFICANT CHANGE UP (ref 0.2–1.2)
BILIRUB UR-MCNC: NEGATIVE — SIGNIFICANT CHANGE UP
BLOOD UR QL VISUAL: HIGH
BUN SERPL-MCNC: 11 MG/DL — SIGNIFICANT CHANGE UP (ref 7–23)
CALCIUM SERPL-MCNC: 9 MG/DL — SIGNIFICANT CHANGE UP (ref 8.4–10.5)
CHLORIDE SERPL-SCNC: 101 MMOL/L — SIGNIFICANT CHANGE UP (ref 98–107)
CO2 SERPL-SCNC: 25 MMOL/L — SIGNIFICANT CHANGE UP (ref 22–31)
COLOR SPEC: YELLOW — SIGNIFICANT CHANGE UP
CREAT SERPL-MCNC: 0.78 MG/DL — SIGNIFICANT CHANGE UP (ref 0.5–1.3)
EOSINOPHIL # BLD AUTO: 0.15 K/UL — SIGNIFICANT CHANGE UP (ref 0–0.5)
EOSINOPHIL NFR BLD AUTO: 2 % — SIGNIFICANT CHANGE UP (ref 0–6)
GLUCOSE SERPL-MCNC: 107 MG/DL — HIGH (ref 70–99)
GLUCOSE UR-MCNC: NEGATIVE — SIGNIFICANT CHANGE UP
HCT VFR BLD CALC: 40.3 % — SIGNIFICANT CHANGE UP (ref 34.5–45)
HGB BLD-MCNC: 13.4 G/DL — SIGNIFICANT CHANGE UP (ref 11.5–15.5)
IMM GRANULOCYTES # BLD AUTO: 0.01 # — SIGNIFICANT CHANGE UP
IMM GRANULOCYTES NFR BLD AUTO: 0.1 % — SIGNIFICANT CHANGE UP (ref 0–1.5)
KETONES UR-MCNC: NEGATIVE — SIGNIFICANT CHANGE UP
LEUKOCYTE ESTERASE UR-ACNC: NEGATIVE — SIGNIFICANT CHANGE UP
LYMPHOCYTES # BLD AUTO: 2.04 K/UL — SIGNIFICANT CHANGE UP (ref 1–3.3)
LYMPHOCYTES # BLD AUTO: 26.8 % — SIGNIFICANT CHANGE UP (ref 13–44)
MCHC RBC-ENTMCNC: 30.3 PG — SIGNIFICANT CHANGE UP (ref 27–34)
MCHC RBC-ENTMCNC: 33.3 % — SIGNIFICANT CHANGE UP (ref 32–36)
MCV RBC AUTO: 91.2 FL — SIGNIFICANT CHANGE UP (ref 80–100)
MONOCYTES # BLD AUTO: 0.76 K/UL — SIGNIFICANT CHANGE UP (ref 0–0.9)
MONOCYTES NFR BLD AUTO: 10 % — SIGNIFICANT CHANGE UP (ref 2–14)
MUCOUS THREADS # UR AUTO: SIGNIFICANT CHANGE UP
NEUTROPHILS # BLD AUTO: 4.6 K/UL — SIGNIFICANT CHANGE UP (ref 1.8–7.4)
NEUTROPHILS NFR BLD AUTO: 60.6 % — SIGNIFICANT CHANGE UP (ref 43–77)
NITRITE UR-MCNC: NEGATIVE — SIGNIFICANT CHANGE UP
NRBC # FLD: 0 — SIGNIFICANT CHANGE UP
PH UR: 6.5 — SIGNIFICANT CHANGE UP (ref 4.6–8)
PLATELET # BLD AUTO: 226 K/UL — SIGNIFICANT CHANGE UP (ref 150–400)
PMV BLD: 9.5 FL — SIGNIFICANT CHANGE UP (ref 7–13)
POTASSIUM SERPL-MCNC: 3.9 MMOL/L — SIGNIFICANT CHANGE UP (ref 3.5–5.3)
POTASSIUM SERPL-SCNC: 3.9 MMOL/L — SIGNIFICANT CHANGE UP (ref 3.5–5.3)
PROT SERPL-MCNC: 6.7 G/DL — SIGNIFICANT CHANGE UP (ref 6–8.3)
PROT UR-MCNC: 20 MG/DL — SIGNIFICANT CHANGE UP
RBC # BLD: 4.42 M/UL — SIGNIFICANT CHANGE UP (ref 3.8–5.2)
RBC # FLD: 12.5 % — SIGNIFICANT CHANGE UP (ref 10.3–14.5)
RBC CASTS # UR COMP ASSIST: HIGH (ref 0–?)
SODIUM SERPL-SCNC: 138 MMOL/L — SIGNIFICANT CHANGE UP (ref 135–145)
SP GR SPEC: 1.03 — SIGNIFICANT CHANGE UP (ref 1–1.04)
SQUAMOUS # UR AUTO: SIGNIFICANT CHANGE UP
UROBILINOGEN FLD QL: NORMAL MG/DL — SIGNIFICANT CHANGE UP
WBC # BLD: 7.6 K/UL — SIGNIFICANT CHANGE UP (ref 3.8–10.5)
WBC # FLD AUTO: 7.6 K/UL — SIGNIFICANT CHANGE UP (ref 3.8–10.5)
WBC UR QL: SIGNIFICANT CHANGE UP (ref 0–?)

## 2018-01-29 PROCEDURE — 74177 CT ABD & PELVIS W/CONTRAST: CPT | Mod: 26

## 2018-01-29 PROCEDURE — 99284 EMERGENCY DEPT VISIT MOD MDM: CPT

## 2018-01-29 RX ORDER — SODIUM CHLORIDE 9 MG/ML
1000 INJECTION INTRAMUSCULAR; INTRAVENOUS; SUBCUTANEOUS ONCE
Qty: 0 | Refills: 0 | Status: COMPLETED | OUTPATIENT
Start: 2018-01-29 | End: 2018-01-29

## 2018-01-29 RX ORDER — ACETAMINOPHEN 500 MG
1000 TABLET ORAL ONCE
Qty: 0 | Refills: 0 | Status: COMPLETED | OUTPATIENT
Start: 2018-01-29 | End: 2018-01-29

## 2018-01-29 RX ORDER — IBUPROFEN 200 MG
1 TABLET ORAL
Qty: 28 | Refills: 0 | OUTPATIENT
Start: 2018-01-29 | End: 2018-02-04

## 2018-01-29 RX ORDER — ONDANSETRON 8 MG/1
1 TABLET, FILM COATED ORAL
Qty: 21 | Refills: 0 | OUTPATIENT
Start: 2018-01-29 | End: 2018-02-04

## 2018-01-29 RX ORDER — METRONIDAZOLE 500 MG
1 TABLET ORAL
Qty: 21 | Refills: 0 | OUTPATIENT
Start: 2018-01-29 | End: 2018-02-04

## 2018-01-29 RX ORDER — CIPROFLOXACIN LACTATE 400MG/40ML
1 VIAL (ML) INTRAVENOUS
Qty: 14 | Refills: 0 | OUTPATIENT
Start: 2018-01-29 | End: 2018-02-04

## 2018-01-29 RX ADMIN — SODIUM CHLORIDE 1000 MILLILITER(S): 9 INJECTION INTRAMUSCULAR; INTRAVENOUS; SUBCUTANEOUS at 11:04

## 2018-01-29 RX ADMIN — Medication 1000 MILLIGRAM(S): at 11:10

## 2018-01-29 RX ADMIN — Medication 400 MILLIGRAM(S): at 11:04

## 2018-01-29 NOTE — ED PROVIDER NOTE - ATTENDING CONTRIBUTION TO CARE
AJM: Pt seen with PA. 48 y/o F pt with pmhx of diverticulitis, urethral diverticulitis presents with c/o abd pain, constipation, and nausea for 4 days worsened last night. Pt states her pain is dull achy, 8/10, constant. Pt notes pain is very similar to prior episode of diverticulitis. + ttp to RLQ on exam. No RUQ ttp. neg frankel sign. Otherwise soft abdomen. Pt comfortable appearing. Likely recurrent right sided diverticulitis, however pt could have appy given symptoms and exam. will check labs, ua, ct a/p

## 2018-01-29 NOTE — ED PROVIDER NOTE - OBJECTIVE STATEMENT
48 y/o F pt with pmhx of diverticulitis, urethral diverticulitis presents with c/o abd pain, constipation, and nausea for 4 days worsened last night. Pt states her pain is dull achy, 8/10, constant, radiating to right side back, relieved with Ibuprofen 600 mg taken 10 pm and last take 2 am today. Pt denies vomiting, fever, sob, cp, dysuria, diarrhea.

## 2018-01-29 NOTE — ED ADULT NURSE NOTE - OBJECTIVE STATEMENT
Pt received to intake area complaining of abdominal pain x3 days worsening today. Pt denies chest pain, SOB, fever or chills. IV access obtained, labs drawn and sent.

## 2018-01-29 NOTE — ED PROVIDER NOTE - PMH
Constipation, unspecified constipation type    Diverticulitis    Urethral diverticulum  history of urethral diverticulum, surgery performed 2 yrs, 2016

## 2018-01-29 NOTE — ED PROVIDER NOTE - PLAN OF CARE
Follow up with your general medical doctor in 2 - 3 days.  Follow up with your GI doctor this week. Referral list attached. Stay well hydrated; follow a bland diet.  Avoid caffeine / fatty/fried / spicy foods.  Avoid nuts / seeds.  Eat / drink in small frequent amounts.  MEDICATIONS:  Ciprofloxacin 500 mg: Take one tablet every 12 hours x 7 days.  Metronidazole 500 mg: Take one tablet every 8 hours x 7 days.  ***Do NOT drink alcohol on these medications; it can cause a severe reaction!!***  Bring your discharge papers / test results to your follow up visits.  Keep these papers for your records.  ***Return to the Emergency Department for any new / worsening symptoms or any problems / concerns / issues.***

## 2018-01-29 NOTE — ED PROVIDER NOTE - MEDICAL DECISION MAKING DETAILS
48 y/o F pt presents with c/o abd pain nausea and chills, r/o appendicitis, possible diverticulitis, cbc, cmp, IV ns, IV tylenol, ucg,

## 2018-01-29 NOTE — ED PROVIDER NOTE - CARE PLAN
Principal Discharge DX:	Diverticulitis  Assessment and plan of treatment:	Follow up with your general medical doctor in 2 - 3 days.  Follow up with your GI doctor this week. Referral list attached. Stay well hydrated; follow a bland diet.  Avoid caffeine / fatty/fried / spicy foods.  Avoid nuts / seeds.  Eat / drink in small frequent amounts.  MEDICATIONS:  Ciprofloxacin 500 mg: Take one tablet every 12 hours x 7 days.  Metronidazole 500 mg: Take one tablet every 8 hours x 7 days.  ***Do NOT drink alcohol on these medications; it can cause a severe reaction!!***  Bring your discharge papers / test results to your follow up visits.  Keep these papers for your records.  ***Return to the Emergency Department for any new / worsening symptoms or any problems / concerns / issues.***

## 2018-01-29 NOTE — ED ADULT TRIAGE NOTE - CHIEF COMPLAINT QUOTE
hx diverticulosis   has been constipated took laxatives  pain was worse last night had difficulty sleeping

## 2018-01-30 LAB — SPECIMEN SOURCE: SIGNIFICANT CHANGE UP

## 2018-01-31 LAB — BACTERIA UR CULT: SIGNIFICANT CHANGE UP

## 2018-08-27 NOTE — ED PROVIDER NOTE - PROGRESS NOTE DETAILS
----- Message from Gabriel Christensen MD sent at 8/27/2018  9:45 AM CDT -----  Pt needs BMP with HH  ----- Message -----  From: Jacqueline Partida MD  Sent: 8/19/2018   3:08 AM  To: Gabriel Christensen MD    Critical access hospital Dr Christensen  I saw Ms Liriano in the er [facility] for what I will say is her chronic pain. Labs were done found that her creatinine was initially 1.6 (normally 1) and K 5.3. I did some digging and found that her cardiologist started spironolactone on 8/10 and also prescribed lasix 80 mg pills with plan for her to take 1/2 prn which she has actually been been taking everyday and sometimes one whole pill so I think we have a reasonable cause for the creatinine bump - as she denies any vomiting or diarrhea. I gave her 1.5 liters NS. Creatinine and K improved on repeat labs and I told her to stop the lasix and to get repeat labs on Monday - I told her to call your office to arrange.  Thanks in advance  Jacqueline Partida     
AJM: + diverticulitis, uncomplicated. dc home with cipro flagyl. pt comfortable with plan.

## 2020-03-20 ENCOUNTER — EMERGENCY (EMERGENCY)
Facility: HOSPITAL | Age: 50
LOS: 1 days | Discharge: LEFT BEFORE DISCHARGE | End: 2020-03-20
Attending: EMERGENCY MEDICINE | Admitting: EMERGENCY MEDICINE
Payer: COMMERCIAL

## 2020-03-20 VITALS
SYSTOLIC BLOOD PRESSURE: 122 MMHG | TEMPERATURE: 98 F | DIASTOLIC BLOOD PRESSURE: 76 MMHG | OXYGEN SATURATION: 100 % | RESPIRATION RATE: 18 BRPM | HEART RATE: 75 BPM

## 2020-03-20 DIAGNOSIS — Z98.89 OTHER SPECIFIED POSTPROCEDURAL STATES: Chronic | ICD-10-CM

## 2020-03-20 PROBLEM — N36.1 URETHRAL DIVERTICULUM: Chronic | Status: ACTIVE | Noted: 2018-01-29

## 2020-03-20 PROBLEM — K57.92 DIVERTICULITIS OF INTESTINE, PART UNSPECIFIED, WITHOUT PERFORATION OR ABSCESS WITHOUT BLEEDING: Chronic | Status: ACTIVE | Noted: 2018-01-29

## 2020-03-20 PROCEDURE — L9991: CPT

## 2020-03-20 RX ORDER — IBUPROFEN 200 MG
400 TABLET ORAL ONCE
Refills: 0 | Status: COMPLETED | OUTPATIENT
Start: 2020-03-20 | End: 2020-03-20

## 2020-03-20 RX ORDER — ACETAMINOPHEN 500 MG
975 TABLET ORAL ONCE
Refills: 0 | Status: COMPLETED | OUTPATIENT
Start: 2020-03-20 | End: 2020-03-20

## 2020-03-20 NOTE — ED PROVIDER NOTE - PATIENT PORTAL LINK FT
You can access the FollowMyHealth Patient Portal offered by [facility] by registering at the following website: [email or web address]. By joining [facility]’s FollowMyHealth portal, you will also be able to view your health information using other applications (apps) compatible with our system.

## 2020-03-20 NOTE — ED ADULT NURSE NOTE - OBJECTIVE STATEMENT
Patient received with c/o sore throat x1 week, worsened yesterday. states she now feels pains while swallowing. Throat is externally tender to touch. Uvula appears sore, white patchy spots noted in the upper palate. Pt denies fever, endorsed mild chills yesterday. Denies coughs or body aches. No further symptoms reported. Awaiting MD fernandez.

## 2020-03-20 NOTE — ED ADULT TRIAGE NOTE - CHIEF COMPLAINT QUOTE
Pt st"For about a week I have sore throat but since yesterday became very painful unable to swallow. " Resp even & unlabored.

## 2020-03-20 NOTE — ED PROVIDER NOTE - CLINICAL SUMMARY MEDICAL DECISION MAKING FREE TEXT BOX
48yo female p/w sore throat, tolerating PO. Erythematous oropharynx on exam, no exudates. No signs of peritonsillar swelling or deviated uvula. Centor Criteria of 0, very low likelihood of strep pharyngitis, peritonsillar abscess. Meds then dc.

## 2020-03-20 NOTE — ED ADULT NURSE NOTE - NS ED NURSE ELOPE COMMENTS
Pt left without announcing and before being given discharge paper work; there was no IV line in place

## 2020-03-20 NOTE — ED PROVIDER NOTE - NSFOLLOWUPINSTRUCTIONS_ED_ALL_ED_FT
- Continue all regular medications  - For pain, take tylenol or ibuprofen as directed on the packaging  - Follow up with your primary doctor within 3 days  - Return to the ER for inability to swallow your saliva, unable to drink/urinate for >12 hours or any worsening symptoms or concerns

## 2020-03-20 NOTE — ED PROVIDER NOTE - PROGRESS NOTE DETAILS
Rebecca PGY1: Discussed importance of follow up and return precautions. Patient agrees with plan. Rebecca PGY1: Upon returning to room, patient not present in the department, eloped. Never seen by attending. Called patient x2, no answer

## 2020-03-20 NOTE — ED PROVIDER NOTE - OBJECTIVE STATEMENT
48yo female p/w 7 days of worsening sore throat, pain with swallowing. Able to swallow saliva and liquids. Ate yesterday. Has not trialed any pain medications. Denies fever, cough, dyspnea, chest pain, rhinorrhea, covid contacts, recent travel.

## 2020-03-20 NOTE — ED PROVIDER NOTE - PHYSICAL EXAMINATION
Physical Exam:  Gen: NAD, AOx3, non-toxic appearing, able to ambulate without assistance  HEENT: erythematous posterior oropharynx, no exudates, uvula midline, no peritonsillar swelling, EOMI, PEERLA, normal conjunctiva, tongue midline, oral mucosa moist  Lung: CTAB, no respiratory distress, no wheezes/rhonchi/rales B/L, speaking in full sentences  CV: RRR, no murmurs, rubs or gallops, distal pulses 2+ b/l  Skin: Warm, well perfused, no rash, no leg swelling  Psych: normal affect, calm

## 2022-12-22 NOTE — ED ADULT TRIAGE NOTE - HEART RATE (BEATS/MIN)
LMTCB x 3 to schedule appointment.    Pt is due for annual exam after 12/29/22. Please send letter.    Janey Yepez on 12/22/2022 at 11:19 AM     89

## 2023-12-04 NOTE — ED PROVIDER NOTE - NS ED ATTENDING STATEMENT MOD
Gastrointestinal Esophagogastroduodenoscopy (EGD)/ Endoscopic Ultrasound(EUS)- Upper Exam Discharge Instructions    1. Call Dr. Lela Vickers  [phone]  for any problems or questions. 2. Contact the doctor's office for follow up appointment as directed. 3. Medication may cause drowsiness for several hours, therefore, do not drive or operate machinery for remainder of the day. 4. No alcohol today. 5. Do not make any important decisions such as signing legal paperwork. 6. Ordinarily, you may resume regular diet and activity after exam unless otherwise specified by your physician. 7. For mild soreness in your throat you may use Cepacol throat lozenges or warm  salt-water gargles as needed. Any additional instructions:   Call the office on Thursday or Friday for pathology results        Instructions given to Daquan Sheldon and other family members.
I have personally seen and examined this patient.  I have fully participated in the care of this patient. I have reviewed all pertinent clinical information, including history, physical exam, plan and the Resident’s note and agree except as noted.

## 2024-09-09 NOTE — ED PROVIDER NOTE - TEMPLATE
Called and spoke to patient. Patient agreed to be put on wait list and keep current scheduled appt. Patient verbalized understanding.   Abdominal Pain, N/V/D